# Patient Record
Sex: FEMALE | Race: WHITE | Employment: OTHER | URBAN - METROPOLITAN AREA
[De-identification: names, ages, dates, MRNs, and addresses within clinical notes are randomized per-mention and may not be internally consistent; named-entity substitution may affect disease eponyms.]

---

## 2020-01-30 ENCOUNTER — HOSPITAL ENCOUNTER (OUTPATIENT)
Dept: PHYSICAL THERAPY | Age: 70
Discharge: HOME | End: 2020-01-30
Payer: COMMERCIAL

## 2020-01-30 PROCEDURE — 97162 PT EVAL MOD COMPLEX 30 MIN: CPT

## 2020-01-30 PROCEDURE — 97530 THERAPEUTIC ACTIVITIES: CPT

## 2020-01-30 PROCEDURE — 97110 THERAPEUTIC EXERCISES: CPT
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
## 2020-02-03 ENCOUNTER — HOSPITAL ENCOUNTER (OUTPATIENT)
Dept: PHYSICAL THERAPY | Age: 70
Discharge: HOME | End: 2020-02-03
Payer: COMMERCIAL

## 2020-02-03 PROCEDURE — 97140 MANUAL THERAPY 1/> REGIONS: CPT

## 2020-02-03 PROCEDURE — 97110 THERAPEUTIC EXERCISES: CPT

## 2020-02-03 NOTE — PROGRESS NOTES
[facility] PT DAILY TREATMENT NOTE    Patient Name: Ashley Mcneal  Date:2/3/2020  : 1950  [x]  Patient  Verified  Payor: VA MEDICARE / Plan: VA MEDICARE PART A & B / Product Type: Medicare /    In time:1030  Out time:1115  Total Treatment Time (min): 55  Total Timed Codes (min): 40  1:1 Treatment Time ([facility] only): 40   Visit #: 2 of 16    Treatment Area: Left hip pain [M25.552]  Low back pain [M54.5]    SUBJECTIVE  Pain Level (0-10 scale): 10/10  Any medication changes, allergies to medications, adverse drug reactions, diagnosis change, or new procedure performed?: [x] No    [] Yes (see summary sheet for update)  Subjective functional status/changes:   [x] No changes reported    OBJECTIVE    Modalities Rationale:     decrease pain and increase tissue extensibility to improve patient's ability to perform ADLs without pain   min [] Estim, type/location:                                      []  att     []  unatt     []  w/US     []  w/ice    []  w/heat    min []  Mechanical Traction: type/lbs                   []  pro   []  sup   []  int   []  cont    []  before manual    []  after manual    min []  Ultrasound, settings/location:      min []  Iontophoresis w/ dexamethasone, location:                                               []  take home patch       []  in clinic   10 min []  Ice     [x]  Heat    location/position: L hip, R sidelying    min []  Vasopneumatic Device, press/temp:     min []  Other:    [x] Skin assessment post-treatment (if applicable):    [x]  intact    []  redness- no adverse reaction     []redness - adverse reaction:        23 min Therapeutic Exercise:  [x] See flow sheet :   Rationale: increase ROM and improve coordination to improve the patients ability to perform ADLs without pain. 12 min Manual Therapy:  STM L psoas, L piriformis   Rationale: increase ROM and increase tissue extensibility to improve the patients ability to  perform ADLs without pain.           With   [] TE   [x] TA   [] neuro   [] other: Patient Education: [x] Review HEP    [] Progressed/Changed HEP based on:   [] positioning   [] body mechanics   [] transfers   [] heat/ice application    [] other:        Pain Level (0-10 scale) post treatment: 3/10    ASSESSMENT/Changes in Function: Throughout stretching and therex, Pt gaining more ROM without symptoms. Pt given HEP for reduction of symptoms and increasing ROM through spine and pelvis. Patient will continue to benefit from skilled PT services to modify and progress therapeutic interventions, address ROM deficits, address strength deficits, analyze and address soft tissue restrictions and analyze and cue movement patterns to attain remaining goals. [x]  See Plan of Care  []  See progress note/recertification  []  See Discharge Summary         Progress towards goals / Updated goals:  Short Term Goals: To be accomplished in 4 weeks:  1. Patient will be compliant with HEP to progress toward goals and restore functional mobility. Eval Status:initiaited one exercise   2. Patient will improve FOTO score by 10% to improve functional tolerance for ADLs. Eval Status: 63  3. Patient will improve pain in L hip to 6/10  to improve ADL tolerance and restore prior level of function. Eval Status:10/10  Long Term Goals: To be accomplished in 8 weeks:  1. Patient will be independent with HEP to progress toward goals of performing ADLs independently and efficienty. Eval Status: initiaited  2. Kavya Dorsey will have improve spinal ROM in all directions without pain to improve mechanics for ambulation/ADLs. Eval Status: 25% with pain  3. Patient will have 4+/5 BLE strength to return to goals of riding bike. Eval Status: 4/5 or less, hip flexion NT due to pain  4. Patient will improve pain in L hip to 0-1/10 to improve ADL tolerance and restore prior level of function.   Eval Status: 10/10    PLAN  [x]  Upgrade activities as tolerated     []  Continue plan of care  []  Update interventions per flow sheet       []  Discharge due to:_  []  Other:_      Joyce Toro 2/3/2020  10:25 AM    Future Appointments   Date Time Provider Evelyn Crocker   2/3/2020 10:30 AM [facility]   2/7/2020  9:30 AM [facility]   2/11/2020  9:00 AM [facility]   2/13/2020 12:45 PM [facility]   2/18/2020  9:45 AM [facility]   2/20/2020 11:15 AM Ana [facility]   2/25/2020  9:45 AM [facility]   2/27/2020 10:30 AM [facility]   3/3/2020  9:45 AM [facility]   3/5/2020  9:45 AM Ana [facility]   3/10/2020  9:45 AM Ana [facility]   3/12/2020 10:30 AM [facility]   3/17/2020  9:45 AM [facility]   3/19/2020 10:30 AM [facility]   3/24/2020  9:45 AM [facility]   3/26/2020 10:30 AM [facility]   3/31/2020  9:45 AM [facility]   4/2/2020 10:30 AM Ana [facility]

## 2020-02-07 ENCOUNTER — HOSPITAL ENCOUNTER (OUTPATIENT)
Dept: PHYSICAL THERAPY | Age: 70
Discharge: HOME | End: 2020-02-07
Payer: COMMERCIAL

## 2020-02-07 PROCEDURE — 97140 MANUAL THERAPY 1/> REGIONS: CPT

## 2020-02-07 PROCEDURE — 97110 THERAPEUTIC EXERCISES: CPT

## 2020-02-07 NOTE — PROGRESS NOTES
PT DAILY TREATMENT NOTE    Patient Name: Nikole Gonzalez  Date:2020  : 1950  [x]  Patient  Verified  Payor: Derek Many / Plan: VA MEDICARE PART A & B / Product Type: Medicare /   In time:930  Out time:102  Total Treatment Time (min): 40  Total Timed Codes (min): 40  1:1 Treatment Time ( [address] only): 40   Visit #: 3 of 16    Treatment Area: Left hip pain [M25.552]  Low back pain [M54.5]    SUBJECTIVE  Pain Level (0-10 scale): 10/10  Any medication changes, allergies to medications, adverse drug reactions, diagnosis change, or new procedure performed?: [x] No    [] Yes (see summary sheet for update)  Subjective functional status/changes:   [] No changes reported  Pt reports soreness in hip for about 3 days. OBJECTIVE    Modalities Rationale:     decrease pain to improve patient's ability to perfrom ADLs without pain. min [] Estim, type/location:                                      []  att     []  unatt     []  w/US     []  w/ice    []  w/heat    min []  Mechanical Traction: type/lbs                   []  pro   []  sup   []  int   []  cont    []  before manual    []  after manual    min []  Ultrasound, settings/location:      min []  Iontophoresis w/ dexamethasone, location:                                               []  take home patch       []  in clinic   10 min []  Ice     [x]  Heat    location/position: L hip    min []  Vasopneumatic Device, press/temp:     min []  Other:    [x] Skin assessment post-treatment (if applicable):    [x]  intact    []  redness- no adverse reaction     []redness - adverse reaction:        25 min Therapeutic Exercise:  [x] See flow sheet :   Rationale: increase ROM and improve coordination to improve the patients ability to perform ADLs without pain. 15 min Manual Therapy:  L piriformis, glute, paraspinals   Rationale: decrease pain and increase ROM to improve the patients ability to perform ADLs without pain.            With   [x] TE   [] TA   [] neuro   [] other: Patient Education: [x] Review HEP    [] Progressed/Changed HEP based on:   [] positioning   [] body mechanics   [] transfers   [] heat/ice application    [] other:         Pain Level (0-10 scale) post treatment: 5/10    ASSESSMENT/Changes in Function: Focus on treatment included symptom reduction and stretching, given as HEP. Pt somewhat proficient with prior therex, requiring min cueing. Patient will continue to benefit from skilled PT services to modify and progress therapeutic interventions, address ROM deficits, address strength deficits and analyze and address soft tissue restrictions to attain remaining goals. [x]  See Plan of Care  []  See progress note/recertification  []  See Discharge Summary         Progress towards goals / Updated goals:  Short Term Goals: To be accomplished in 4 weeks:  1. Patient will be compliant with HEP to progress toward goals and restore functional mobility. Eval Status:initiaited one exercise   2. Patient will improve FOTO score by 10% to improve functional tolerance for ADLs. Eval Status: 63  3. Patient will improve pain in L hip to 6/10  to improve ADL tolerance and restore prior level of function. Eval Status:10/10  Long Term Goals: To be accomplished in 8 weeks:  1. Patient will be independent with HEP to progress toward goals of performing ADLs independently and efficienty. Eval Status: initiaited  2. Pam Hastings will have improve spinal ROM in all directions without pain to improve mechanics for ambulation/ADLs. Eval Status: 25% with pain  3. Patient will have 4+/5 BLE strength to return to goals of riding bike. Eval Status: 4/5 or less, hip flexion NT due to pain  4. Patient will improve pain in L hip to 0-1/10 to improve ADL tolerance and restore prior level of function.   Eval Status: 10/10    PLAN  [x]  Upgrade activities as tolerated     []  Continue plan of care  []  Update interventions per flow sheet       []  Discharge due to:_  []  Other:_ Mihai Fuchs 2/7/2020  9:35 AM    Future Appointments   Date Time Provider Evelyn Lizzette   2/11/2020  9:00 AM [facility]   2/13/2020 12:45 PM Onnie Messing [facility]   2/18/2020  9:45 AM Onnie Messing [facility]   2/20/2020 11:15 AM Onnie Messing [facility]   2/25/2020  9:45 AM Onnie Messing [facility]   2/27/2020 10:30 AM Onnie Messing [facility]   3/3/2020  9:45 AM Onnie Messing [facility]   3/5/2020  9:45 AM Onnie Messing [facility]   3/10/2020  9:45 AM Onnie Messing [facility]   3/12/2020 10:30 AM Onnie Messing [facility]   3/17/2020  9:45 AM Onnie Messing [facility]   3/19/2020 10:30 AM Onnie Messing [facility]   3/24/2020  9:45 AM Onnie Messing [facility]   3/26/2020 10:30 AM Onnie Messing [facility]   3/31/2020  9:45 AM Onnie Messing [facility]   4/2/2020 10:30 AM Onnie Messing [facility]

## 2020-02-11 ENCOUNTER — HOSPITAL ENCOUNTER (OUTPATIENT)
Dept: PHYSICAL THERAPY | Age: 70
Discharge: HOME | End: 2020-02-11
Payer: COMMERCIAL

## 2020-02-11 PROCEDURE — 97140 MANUAL THERAPY 1/> REGIONS: CPT

## 2020-02-11 PROCEDURE — 97110 THERAPEUTIC EXERCISES: CPT

## 2020-02-11 NOTE — PROGRESS NOTES
PT DAILY TREATMENT NOTE    Patient Name: Lelia Guevara  Date:2020  : 1950  [x]  Patient  Verified  Payor: Inessa Guzman / Plan: [place] [employer] / Product Type: HMO /    In time:0900  Out GLNX:[id number]  Total Treatment Time (min): 55  Total Timed Codes (min): 40  1:1 Treatment Time ( only): 40   Visit #: 4 of 16    Treatment Area: Left hip pain [M25.552]  Low back pain [M54.5]    SUBJECTIVE  Pain Level (0-10 scale): 10/10  Any medication changes, allergies to medications, adverse drug reactions, diagnosis change, or new procedure performed?: [x] No    [] Yes (see summary sheet for update)  Subjective functional status/changes:   [] No changes reported  Pt reports being stiff and having pain for about two days since last visit. OBJECTIVE    Modalities Rationale:     decrease pain and increase tissue extensibility to improve patient's ability to ambulate without pain. min [] Estim, type/location:                                      []  att     []  unatt     []  w/US     []  w/ice    []  w/heat    min []  Mechanical Traction: type/lbs                   []  pro   []  sup   []  int   []  cont    []  before manual    []  after manual    min []  Ultrasound, settings/location:      min []  Iontophoresis w/ dexamethasone, location:                                               []  take home patch       []  in clinic   10 min []  Ice     [x]  Heat    location/position: L hip    min []  Vasopneumatic Device, press/temp:     min []  Other:    [x] Skin assessment post-treatment (if applicable):    [x]  intact    []  redness- no adverse reaction     []redness - adverse reaction:        25 min Therapeutic Exercise:  [x] See flow sheet :   Rationale: increase ROM, increase strength and improve coordination to improve the patients ability to perform ADLs without pain.     15 min Manual Therapy:  STM L piriformis, QL, paraspinals    Rationale: decrease pain and increase ROM to improve the patients ability to ambulate without pain. With   [x] TE   [] TA   [] neuro   [] other: Patient Education: [x] Review HEP    [] Progressed/Changed HEP based on:   [] positioning   [] body mechanics   [] transfers   [] heat/ice application    [] other:         Pain Level (0-10 scale) post treatment: 5/10    ASSESSMENT/Changes in Function: Pts mobility is improving, incorporating more strengthening exercises with reduction in symptoms during treatment. Pt encouraged to walk as HEP to increase functioning. Patient will continue to benefit from skilled PT services to modify and progress therapeutic interventions, address functional mobility deficits, address ROM deficits and address strength deficits to attain remaining goals. [x]  See Plan of Care  []  See progress note/recertification  []  See Discharge Summary         Progress towards goals / Updated goals:  Short Term Goals: To be accomplished in 4 weeks:  1. Patient will be compliant with HEP to progress toward goals and restore functional mobility. Eval Status:initiaited one exercise   2. Patient will improve FOTO score by 10% to improve functional tolerance for ADLs. Eval Status: 63  3. Patient will improve pain in L hip to 6/10  to improve ADL tolerance and restore prior level of function. Eval Status:10/10  Long Term Goals: To be accomplished in 8 weeks:  1. Patient will be independent with HEP to progress toward goals of performing ADLs independently and efficienty. Eval Status: initiaited  2. Andre Aponte will have improve spinal ROM in all directions without pain to improve mechanics for ambulation/ADLs. Eval Status: 25% with pain  3. Patient will have 4+/5 BLE strength to return to goals of riding bike. Eval Status: 4/5 or less, hip flexion NT due to pain  4. Patient will improve pain in L hip to 0-1/10 to improve ADL tolerance and restore prior level of function.   Eval Status: 10/10    PLAN  [x]  Upgrade activities as tolerated     []  Continue plan of care  [] Update interventions per flow sheet       []  Discharge due to:_  []  Other:_      Derik Aparicio 2/11/2020  8:12 AM    Future Appointments   Date Time Provider Evelyn Crocker   2/11/2020  9:00 AM [facility]   2/13/2020 12:45 PM [facility]   2/18/2020  9:45 AM [facility]   2/20/2020 11:15 AM [facility]   2/25/2020  9:45 AM [facility]   2/27/2020 10:30 AM [facility]   3/3/2020  9:45 AM [facility]   3/5/2020  9:45 AM [facility]   3/10/2020  9:45 AM [facility]   3/12/2020 10:30 AM [facility]   3/17/2020  9:45 AM [facility]   3/19/2020 10:30 AM [facility] [place] [facility]   3/24/2020  9:45 AM [facility]   3/26/2020 10:30 AM [facility]   3/31/2020  9:45 AM [facility] [place] [facility]   4/2/2020 10:30 AM [facility]

## 2020-02-13 ENCOUNTER — HOSPITAL ENCOUNTER (OUTPATIENT)
Dept: PHYSICAL THERAPY | Age: 70
Discharge: HOME | End: 2020-02-13
Payer: COMMERCIAL

## 2020-02-13 PROCEDURE — 97530 THERAPEUTIC ACTIVITIES: CPT

## 2020-02-13 PROCEDURE — 97110 THERAPEUTIC EXERCISES: CPT

## 2020-02-13 NOTE — PROGRESS NOTES
PT DAILY TREATMENT NOTE    Patient Name: Swati Resendiz  Date:2020  : 1950  [x]  Patient  Verified  Payor: Yulia Lara / Plan: [employer] / Product Type: HMO /    In time:1245  Out time:1255  Total Treatment Time (min): 55  Total Timed Codes (min): 40  1:1 Treatment Time ( only): 40   Visit #: 5 of 16    Treatment Area: Left hip pain [M25.552]  Low back pain [M54.5]    SUBJECTIVE  Pain Level (0-10 scale): 7/10  Any medication changes, allergies to medications, adverse drug reactions, diagnosis change, or new procedure performed?: [x] No    [] Yes (see summary sheet for update)  Subjective functional status/changes:   [] No changes reported  Pt reports soreness and symptoms lasting one day after tx, but doing much better now. OBJECTIVE    Modalities Rationale:     decrease pain and increase tissue extensibility to improve patient's ability to perform ADLs without pain. min [] Estim, type/location:                                      []  att     []  unatt     []  w/US     []  w/ice    []  w/heat    min []  Mechanical Traction: type/lbs                   []  pro   []  sup   []  int   []  cont    []  before manual    []  after manual    min []  Ultrasound, settings/location:      min []  Iontophoresis w/ dexamethasone, location:                                               []  take home patch       []  in clinic   10 min []  Ice     [x]  Heat    location/position: L hip    min []  Vasopneumatic Device, press/temp:     min []  Other:    [x] Skin assessment post-treatment (if applicable):    [x]  intact    []  redness- no adverse reaction     []redness - adverse reaction:        30 min Therapeutic Exercise:  [x] See flow sheet :   Rationale: increase ROM, improve coordination and increase proprioception to improve the patients ability to perform ADLs without pain.      10 min Therapeutic Activity:  [x]  See flow sheet :   Rationale: increase ROM  to improve the patients ability to perform ADLs without pain. With   [] TE   [] TA   [] neuro   [] other: Patient Education: [x] Review HEP    [] Progressed/Changed HEP based on:   [] positioning   [] body mechanics   [] transfers   [] heat/ice application    [] other:        Pain Level (0-10 scale) post treatment: 2/10    ASSESSMENT/Changes in Function: Pts movement quality is improving with reduction in symptoms overall. Patient will continue to benefit from skilled PT services to modify and progress therapeutic interventions, address functional mobility deficits, address ROM deficits, address strength deficits and analyze and address soft tissue restrictions to attain remaining goals. [x]  See Plan of Care  []  See progress note/recertification  []  See Discharge Summary         Progress towards goals / Updated goals:  Short Term Goals: To be accomplished in 4 weeks:  1. Patient will be compliant with HEP to progress toward goals and restore functional mobility. Eval Status:initiaited one exercise   2. Patient will improve FOTO score by 10% to improve functional tolerance for ADLs. Eval Status: 63  3. Patient will improve pain in L hip to 6/10  to improve ADL tolerance and restore prior level of function. Eval Status:10/10  Long Term Goals: To be accomplished in 8 weeks:  1. Patient will be independent with HEP to progress toward goals of performing ADLs independently and efficienty. Eval Status: initiaited  2. Ever Kerr will have improve spinal ROM in all directions without pain to improve mechanics for ambulation/ADLs. Eval Status: 25% with pain  3. Patient will have 4+/5 BLE strength to return to goals of riding bike. Eval Status: 4/5 or less, hip flexion NT due to pain  4. Patient will improve pain in L hip to 0-1/10 to improve ADL tolerance and restore prior level of function.   Eval Status: 10/10    PLAN  [x]  Upgrade activities as tolerated     []  Continue plan of care  []  Update interventions per flow sheet       []  Discharge due to:_  []  Other:_      Raquel Bradley 2/13/2020  11:05 AM    Future Appointments   Date Time Provider Evelyn Lizzette   [facility]2020 12[facility]   2/18/2020  9:[facility]   2/20/2020 11:[facility]   2/25/2020  9:45 [facility]   2/27/2020 10:30 [facility]   3/3/2020  9:45 [facility] [place] [facility]   3/5/2020  9[facility]45 [facility] [place] [facility]   3/10/2020  9:45 AM [facility] [place] [facility]   3/12/2020 10:30 [facility]   3/17/2020  9:45 AM [facility]   3/19/2020 10:30 [facility]   3/24/2020  9:45 [facility] [place] [facility]   3/26/2020 10:30 [facility] [place] [facility]   3/31/2020  9:45 [facility] [place] [facility]   [facility]/2/2020 [facility]

## 2020-02-18 ENCOUNTER — HOSPITAL ENCOUNTER (OUTPATIENT)
Dept: PHYSICAL THERAPY | Age: 70
Discharge: HOME | End: 2020-02-18
Payer: COMMERCIAL

## 2020-02-18 PROCEDURE — 97110 THERAPEUTIC EXERCISES: CPT

## 2020-02-18 NOTE — PROGRESS NOTES
PT DAILY TREATMENT NOTE    Patient Name: Mitch Zavala  Date:2020  : 1950  [x]  Patient  Verified  Payor: Ami Donis / Plan: [employer] / Product Type: HMO /    In JSSO:[id number]  Out time:1030  Total Treatment Time (min): 55  Total Timed Codes (min): 40  1:1 Treatment Time ( [address] only): 40   Visit #: 6 of 16    Treatment Area: Left hip pain [M25.552]  Low back pain [M54.5]    SUBJECTIVE  Pain Level (0-10 scale): 10/10  Any medication changes, allergies to medications, adverse drug reactions, diagnosis change, or new procedure performed?: [x] No    [] Yes (see summary sheet for update)  Subjective functional status/changes:   [] No changes reported  Pt confirmed from OBGYN: posterior uterine body mass 0.6x 0.6x 0.3 cm, uterine leiomyoma, benign. OBJECTIVE    Modalities Rationale:     decrease pain and increase tissue extensibility to improve patient's ability to perform ADLs without pain. min [] Estim, type/location:                                      []  att     []  unatt     []  w/US     []  w/ice    []  w/heat    min []  Mechanical Traction: type/lbs                   []  pro   []  sup   []  int   []  cont    []  before manual    []  after manual    min []  Ultrasound, settings/location:      min []  Iontophoresis w/ dexamethasone, location:                                               []  take home patch       []  in clinic   10 min []  Ice     [x]  Heat    location/position: L hip    min []  Vasopneumatic Device, press/temp:     min []  Other:    [x] Skin assessment post-treatment (if applicable):    [x]  intact    []  redness- no adverse reaction     []redness - adverse reaction:        40 min Therapeutic Exercise:  [x] See flow sheet :   Rationale: increase ROM, increase strength and improve coordination to improve the patients ability to perform ADLs without pain.           With   [x] TE   [] TA   [] neuro   [] other: Patient Education: [x] Review HEP    [] Progressed/Changed HEP based on: [] positioning   [] body mechanics   [] transfers   [] heat/ice application    [] other:        Pain Level (0-10 scale) post treatment: 3/10    ASSESSMENT/Changes in Function: Pt continues to improve with less cueing during therex indicating adherence to HEP. Pt reports no increase in symptoms during therex and will benefit from education on neuro-pain science as Pt tends to catastrophize. Patient will continue to benefit from skilled PT services to modify and progress therapeutic interventions, address functional mobility deficits, address ROM deficits and address strength deficits to attain remaining goals. [x]  See Plan of Care  []  See progress note/recertification  []  See Discharge Summary         Progress towards goals / Updated goals:  Short Term Goals: To be accomplished in 4 weeks:  1. Patient will be compliant with HEP to progress toward goals and restore functional mobility. Eval Status:initiaited one exercise   2/18/20: adherent to HEP daily   2. Patient will improve FOTO score by 10% to improve functional tolerance for ADLs. Eval Status: 63  3. Patient will improve pain in L hip to 6/10  to improve ADL tolerance and restore prior level of function. Eval Status:10/10  Long Term Goals: To be accomplished in 8 weeks:  1. Patient will be independent with HEP to progress toward goals of performing ADLs independently and efficienty. Eval Status: initiaited  2. Logan Donovan will have improve spinal ROM in all directions without pain to improve mechanics for ambulation/ADLs. Eval Status: 25% with pain  2/18/20: 50% limited pain   3. Patient will have 4+/5 BLE strength to return to goals of riding bike. Eval Status: 4/5 or less, hip flexion NT due to pain  4. Patient will improve pain in L hip to 0-1/10 to improve ADL tolerance and restore prior level of function.   Eval Status: 10/10    PLAN  [x]  Upgrade activities as tolerated     []  Continue plan of care  []  Update interventions per flow sheet       []  Discharge due to:_  []  Other:_      Derik Markus 2/18/2020  9:57 AM    Future Appointments   Date Time Provider Evelyn Lizzette   2/20/2020 11:15 AM [facility]   2/25/2020  9:45 AM [facility]   2/27/2020 10:30 AM [facility]   3/3/2020  9:45 AM [facility]   3/5/2020  9:45 AM [facility]   3/10/2020  9:45 AM [facility]   3/12/2020 10:30 AM [facility]   3/17/2020  9:45 AM [facility]   3/19/2020 10:30 AM [facility]   3/24/2020  9:45 AM [facility]   3/26/2020 10:30 AM [facility]   3/31/2020  9:45 AM [facility] [place] [facility]   4/2/2020 10:30 AM [facility]

## 2020-02-20 ENCOUNTER — HOSPITAL ENCOUNTER (OUTPATIENT)
Dept: PHYSICAL THERAPY | Age: 70
Discharge: HOME | End: 2020-02-20
Payer: COMMERCIAL

## 2020-02-20 PROCEDURE — 97110 THERAPEUTIC EXERCISES: CPT

## 2020-02-20 NOTE — PROGRESS NOTES
PT DAILY TREATMENT NOTE    Patient Name: Kalyn Herrera  Date:2020  : 1950  [x]  Patient  Verified  Payor: Ellen Plummer / Plan: [employer] / Product Type: HMO /    In time:1115  Out time:1210  Total Treatment Time (min): 40  Total Timed Codes (min): 40  1:1 Treatment Time ( only): 40   Visit #: 7 of 16    Treatment Area: Left hip pain [M25.552]  Low back pain [M54.5]    SUBJECTIVE  Pain Level (0-10 scale): 8/10  Any medication changes, allergies to medications, adverse drug reactions, diagnosis change, or new procedure performed?: [x] No    [] Yes (see summary sheet for update)  Subjective functional status/changes:   [x] No changes reported      OBJECTIVE    Modalities Rationale:     decrease pain and increase tissue extensibility to improve patient's ability to perform ADLs without pain. min [] Estim, type/location:                                      []  att     []  unatt     []  w/US     []  w/ice    []  w/heat    min []  Mechanical Traction: type/lbs                   []  pro   []  sup   []  int   []  cont    []  before manual    []  after manual    min []  Ultrasound, settings/location:      min []  Iontophoresis w/ dexamethasone, location:                                               []  take home patch       []  in clinic   10 min []  Ice     [x]  Heat    location/position: L hip    min []  Vasopneumatic Device, press/temp:     min []  Other:    [x] Skin assessment post-treatment (if applicable):    [x]  intact    []  redness- no adverse reaction     []redness - adverse reaction:        40 min Therapeutic Exercise:  [x] See flow sheet :   Rationale: increase ROM, increase strength and improve coordination to improve the patients ability to perform ADLs without pain.     With   [] TE   [] TA   [] neuro   [] other: Patient Education: [x] Review HEP    [] Progressed/Changed HEP based on:   [] positioning   [] body mechanics   [] transfers   [] heat/ice application    [] other:        Pain Level (0-10 scale) post treatment: 8/10    ASSESSMENT/Changes in Function: Pt progressing well with therex and mobility exercises. Will progress to seated and standing activities moving forward. Patient will continue to benefit from skilled PT services to modify and progress therapeutic interventions, address ROM deficits, address strength deficits and analyze and address soft tissue restrictions to attain remaining goals. [x]  See Plan of Care  []  See progress note/recertification  []  See Discharge Summary         Progress towards goals / Updated goals:  Short Term Goals: To be accomplished in 4 weeks:  1. Patient will be compliant with HEP to progress toward goals and restore functional mobility. Eval Status:initiaited one exercise   Current: 2/18/20: adherent to HEP daily   2. Patient will improve FOTO score by 10% to improve functional tolerance for ADLs. Eval Status: 63  Current: 2/18  3. Patient will improve pain in L hip to 6/10  to improve ADL tolerance and restore prior level of function. Eval Status:10/10  Long Term Goals: To be accomplished in 8 weeks:  1. Patient will be independent with HEP to progress toward goals of performing ADLs independently and efficienty. Eval Status: initiaited  2. Maikel James will have improve spinal ROM in all directions without pain to improve mechanics for ambulation/ADLs. Eval Status: 25% with pain  2/18/20: 50% limited pain   3. Patient will have 4+/5 BLE strength to return to goals of riding bike. Eval Status: 4/5 or less, hip flexion NT due to pain  4. Patient will improve pain in L hip to 0-1/10 to improve ADL tolerance and restore prior level of function.   Eval Status: 10/10  Current: 2/20/20: 8/10      PLAN  [x]  Upgrade activities as tolerated     []  Continue plan of care  []  Update interventions per flow sheet       []  Discharge due to:_  []  Other:_      Hulen Lefort 2/20/2020  10:34 AM    Future Appointments   Date Time Provider Department [place]   2/20/2020 11:15 [facility]   2/25/2020  9:45 AM [facility]   2/27/2020 10:30 [facility]   [facility]3/2020  [facility]   3/5/2020  9:[facility]   3/10/2020  9:[facility]   3/12/2020 10:30 [facility]   3/17/2020  9:45 [facility]   3/19/2020 10:30 AM [facility]   3/24/2020  9:[facility]   3/26/2020 10:30 [facility]   3/31/2020  9:45 [facility]   4/2/2020 10:30 [facility]

## 2020-02-25 ENCOUNTER — HOSPITAL ENCOUNTER (OUTPATIENT)
Dept: PHYSICAL THERAPY | Age: 70
Discharge: HOME | End: 2020-02-25
Payer: COMMERCIAL

## 2020-02-25 PROCEDURE — 97110 THERAPEUTIC EXERCISES: CPT

## 2020-02-25 NOTE — PROGRESS NOTES
PT DAILY TREATMENT NOTE    Patient Name: Mitch Zavala  OPPK:  : 1950  [x]  Patient  Verified  Payor: Ami Donis / Plan: [place] [employer] / Product Type: HMO /    In time:950  Out time:1050  Total Treatment Time (min): 60  Total Timed Codes (min): 40  1:1 Treatment Time ( only): 40   Visit #: 8 of 16    Treatment Area: Left hip pain [M25.552]  Low back pain [M54.5]    SUBJECTIVE  Pain Level (0-10 scale): 9/10  Any medication changes, allergies to medications, adverse drug reactions, diagnosis change, or new procedure performed?: [x] No    [] Yes (see summary sheet for update)  Subjective functional status/changes:   [] No changes reported  Pt reports taking a warm shower due to soreness in glutes. OBJECTIVE    Modalities Rationale:     decrease pain and increase tissue extensibility to improve patient's ability to perform ADLs without pain. min [] Estim, type/location:                                      []  att     []  unatt     []  w/US     []  w/ice    []  w/heat    min []  Mechanical Traction: type/lbs                   []  pro   []  sup   []  int   []  cont    []  before manual    []  after manual    min []  Ultrasound, settings/location:      min []  Iontophoresis w/ dexamethasone, location:                                               []  take home patch       []  in clinic   10 min []  Ice     [x]  Heat    location/position: L hip    min []  Vasopneumatic Device, press/temp:     min []  Other:    [x] Skin assessment post-treatment (if applicable):    [x]  intact    []  redness- no adverse reaction     []redness - adverse reaction:        40 min Therapeutic Exercise:  [x] See flow sheet :   Rationale: increase ROM, increase strength and improve coordination to improve the patients ability to perform ADLs without pain.     With   [x] TE   [] TA   [] neuro   [] other: Patient Education: [x] Review HEP    [] Progressed/Changed HEP based on:   [] positioning   [] body mechanics   [] transfers   [] heat/ice application    [] other:        Pain Level (0-10 scale) post treatment: 2/10    ASSESSMENT/Changes in Function: Pt continues to progress with strengthening and endurance therex for glutes and hips. Has progressed to standing activities and given HEP. Patient will continue to benefit from skilled PT services to address functional mobility deficits, address ROM deficits, address strength deficits and analyze and address soft tissue restrictions to attain remaining goals. [x]  See Plan of Care  []  See progress note/recertification  []  See Discharge Summary         Progress towards goals / Updated goals:  Short Term Goals: To be accomplished in 4 weeks:  1. Patient will be compliant with HEP to progress toward goals and restore functional mobility. Eval Status:initiaited one exercise   Current: 2/18/20: adherent to HEP daily   2. Patient will improve FOTO score by 10% to improve functional tolerance for ADLs. Eval Status: 63  Current: 2/18  3. Patient will improve pain in L hip to 6/10  to improve ADL tolerance and restore prior level of function. Eval Status:10/10  Long Term Goals: To be accomplished in 8 weeks:  1. Patient will be independent with HEP to progress toward goals of performing ADLs independently and efficienty. Eval Status: initiaited  2. Leann Mena will have improve spinal ROM in all directions without pain to improve mechanics for ambulation/ADLs. Eval Status: 25% with pain  2/18/20: 50% limited pain   3. Patient will have 4+/5 BLE strength to return to goals of riding bike. Eval Status: 4/5 or less, hip flexion NT due to pain  4. Patient will improve pain in L hip to 0-1/10 to improve ADL tolerance and restore prior level of function.   Eval Status: 10/10  Current: 2/20/20: 8/10      PLAN  [x]  Upgrade activities as tolerated     []  Continue plan of care  []  Update interventions per flow sheet       []  Discharge due to:_  []  Other:_      CEAMP Ivette Brice 2/25/2020  8:44 AM    Future Appointments   Date Time Provider Evelyn Crocker   2/25/2020  9:45 AM Remi Raw [facility]   2/27/2020 10:30 AM Remi Raw [facility]   3/3/2020  9:45 AM Remi Raw [facility]   3/5/2020  9:45 AM Remi Raw [facility]   3/10/2020  9:45 AM Remi Raw [facility]   3/12/2020 10:30 AM Remi Raw [facility]   3/17/2020  9:45 AM Remi Raw [facility]   3/19/2020 10:30 AM Remi Raw [facility]   3/24/2020  9:45 AM [facility]   3/26/2020 10:30 AM Remi Raw [facility]   3/31/2020  9:45 AM [facility]   4/2/2020 10:30 AM Remi Raw [facility]

## 2020-02-27 ENCOUNTER — APPOINTMENT (OUTPATIENT)
Dept: PHYSICAL THERAPY | Age: 70
End: 2020-02-27
Payer: COMMERCIAL

## 2020-03-03 ENCOUNTER — HOSPITAL ENCOUNTER (OUTPATIENT)
Dept: PHYSICAL THERAPY | Age: 70
Discharge: HOME | End: 2020-03-03
Payer: COMMERCIAL

## 2020-03-03 PROCEDURE — 97110 THERAPEUTIC EXERCISES: CPT

## 2020-03-03 NOTE — PROGRESS NOTES
PT DAILY TREATMENT NOTE    Patient Name: Steve Ruiz  Date:3/3/2020  : 1950  [x]  Patient  Verified  Payor: Micah Chung / Plan: [place] [employer] / Product Type: HMO /    In JSHP:8884  Out time:1040  Total Treatment Time (min): 55  Total Timed Codes (min): 40  1:1 Treatment Time ( [address] only): 40   Visit #: 9 of 16    Treatment Area: Left hip pain [M25.552]  Low back pain [M54.5]    SUBJECTIVE  Pain Level (0-10 scale): 7/10  Any medication changes, allergies to medications, adverse drug reactions, diagnosis change, or new procedure performed?: [x] No    [] Yes (see summary sheet for update)  Subjective functional status/changes:   [] No changes reported  Pt reports hip pain has almost gone away but low back is still hurting with functional activities (cleaning litter box). OBJECTIVE    Modalities Rationale:     decrease pain to improve patient's ability to perform ADLs without pain. min [] Estim, type/location:                                      []  att     []  unatt     []  w/US     []  w/ice    []  w/heat    min []  Mechanical Traction: type/lbs                   []  pro   []  sup   []  int   []  cont    []  before manual    []  after manual    min []  Ultrasound, settings/location:      min []  Iontophoresis w/ dexamethasone, location:                                               []  take home patch       []  in clinic   10 min []  Ice     [x]  Heat    location/position: L hip    min []  Vasopneumatic Device, press/temp:     min []  Other:    [x] Skin assessment post-treatment (if applicable):    [x]  intact    []  redness- no adverse reaction     []redness - adverse reaction:        40 min Therapeutic Exercise:  [x] See flow sheet :   Rationale: increase ROM, increase strength and improve coordination to improve the patients ability to perform ADLs without pain.           With   [] TE   [] TA   [] neuro   [] other: Patient Education: [x] Review HEP    [] Progressed/Changed HEP based on:   [] positioning   [] body mechanics   [] transfers   [] heat/ice application    [] other:      Other Objective/Functional Measures: Pt improving with lumbar AROM, without pain     Pain Level (0-10 scale) post treatment: 7/10    ASSESSMENT/Changes in Function: Pt continues to focus on the pain and fear avoidance behaviors requiring constant encouragement and education on pain management, difference between real and perceived pain from a noxious stimulus. Pt progressing with therex but still requires min cueing for HEP exercises. Patient will continue to benefit from skilled PT services to address functional mobility deficits, address ROM deficits, address strength deficits and analyze and cue movement patterns to attain remaining goals. []  See Plan of Care  [x]  See progress note/recertification  []  See Discharge Summary         Progress towards goals / Updated goals:  Short Term Goals: To be accomplished in 4 weeks:  1. Patient will be compliant with HEP to progress toward goals and restore functional mobility. Eval Status: initiaited one exercise    Current: 3/3/20 adherent to HEP daily   2. Patient will improve FOTO score by 10% to improve functional tolerance for ADLs. Eval Status: 63   Current: 3/3/20 63, Not Met  3. Patient will improve pain in L hip to 6/10  to improve ADL tolerance and restore prior level of function. Eval Status:10/10   Current: 3/3/20 7/10 pain, Progressing   Long Term Goals: To be accomplished in 8 weeks:  1. Patient will be independent with HEP to progress toward goals of performing ADLs independently and efficienty. Eval Status: initiated   Current: 3/3/20 adherent to HEP daily   2. Alejandro Arenas will have improve spinal ROM in all directions without pain to improve mechanics for ambulation/ADLs. Eval Status: 25% with pain  2/18/20: 50% limited pain    Current: 3/3/20 50-75% no pain, Progressing  3. Patient will have 4+/5 BLE strength to return to goals of riding bike.   Eval Status: 4/5 or less, hip flexion NT due to pain   Current: 3/3/20 4/5 no pain, Progressing  4. Patient will improve pain in L hip to 0-1/10 to improve ADL tolerance and restore prior level of function.   Eval Status: 10/10  Current: 2/20/20: 8/10  Current: 3/3/20 7/10 pain, Progressing    PLAN  [x]  Upgrade activities as tolerated     []  Continue plan of care  []  Update interventions per flow sheet       []  Discharge due to:_  []  Other:_      Sydney Dayl 3/3/2020  9:43 AM    Future Appointments   Date Time Provider Evelyn Crocker   3/3/2020  9:45 AM [facility]   3/5/2020  9:45 AM [facility]   3/10/2020  9:45 AM [facility]   3/12/2020 10:30 AM [facility]   3/17/2020  9:45 AM [facility]   3/20/2020  9:30 AM [facility]   3/24/2020  9:45 AM [facility]   3/27/2020  9:30 AM [facility]   3/31/2020  9:45 AM [facility]   4/2/2020 10:30 AM [facility]

## 2020-03-03 NOTE — PROGRESS NOTES
[facility]  [address], [address] [place]  Ph [phone]  Fx [phone]    Physical Therapy Progress Note  Patient name: Linda Hester Start of Care: 2020   Referral source: Daralene Cowden, MD : 1950                Medical Diagnosis: Left hip pain [M25.552]  Low back pain [M54.5]    Onset Date:             2020   Treatment Diagnosis: Left hip pain [M25.552]  Low back pain [M54.5]                                               Prior Hospitalization: see medical history Provider#: [id number]   Medications: Verified on Patient summary List    Comorbidities: Jose in L tibia/fibula , cervical fusion , diagnosed with spondylolisthesis     Prior Level of Function: Independent with all ADLs, and community activities     Visits from Start of Care: 9    Missed Visits: 1    Key Functional Changes: Pt has shown improvements in BLE ROM and strength, with a reduction in pain. Pt will continue to benefit from skilled PT to improve functional movements and address biomechanical faults to prevent reoccurrence of low back and hip pain in the future. Updated Goals: To be achieved in 4 weeks:  Short Term Goals: To be accomplished in 4 weeks:  1. Patient will be compliant with HEP to progress toward goals and restore functional mobility. Eval Status: initiaited one exercise    Current: 20 adherent to HEP daily   2. Patient will improve FOTO score by 10% to improve functional tolerance for ADLs. Eval Status: 63   Current: 2063, Not Met  3. Patient will improve pain in L hip to /10  to improve ADL tolerance and restore prior level of function. Eval Status:10/10   Current: 2/25/207/10 pain, Progressing   Long Term Goals: To be accomplished in 8 weeks:  1. Patient will be independent with HEP to progress toward goals of performing ADLs independently and efficienty. Eval Status: initiated   Current: 20adherent to HEP daily   2.  Patietn will have improve spinal ROM in all directions without pain to improve mechanics for ambulation/ADLs. Eval Status: 25% with pain  2/18/20: 50% limited pain    Current: 2/25/2050-75% no pain, Progressing  3. Patient will have 4+/5 BLE strength to return to goals of riding bike. Eval Status: 4/5 or less, hip flexion NT due to pain   Current: 2/25/204/5 no pain, Progressing  4. Patient will improve pain in L hip to 0-1/10 to improve ADL tolerance and restore prior level of function.   Eval Status: 10/10  Current: 2/20/20: 8/10  Current: 2/25/207/10 pain, Progressing    ASSESSMENT/RECOMMENDATIONS:  [x]Continue therapy per initial plan/protocol at a frequency of  2 x per week for 4 weeks  []Continue therapy with the following recommended changes:_____________________ _____________________________ ________________________________________  []Discontinue therapy progressing towards or have reached established goals  []Discontinue therapy due to lack of appreciable progress towards goals  []Discontinue therapy due to lack of attendance or compliance  []Await Physician's recommendations/decisions regarding therapy  []Other:________________________________________________________________    Thank you for this referral.   Eda Castano 3/3/2020 12:26 PM

## 2020-03-05 ENCOUNTER — HOSPITAL ENCOUNTER (OUTPATIENT)
Dept: PHYSICAL THERAPY | Age: 70
Discharge: HOME | End: 2020-03-05
Payer: COMMERCIAL

## 2020-03-05 PROCEDURE — 97140 MANUAL THERAPY 1/> REGIONS: CPT

## 2020-03-05 PROCEDURE — 97110 THERAPEUTIC EXERCISES: CPT

## 2020-03-05 NOTE — PROGRESS NOTES
PT DAILY TREATMENT NOTE    Patient Name: Dulce Maria Gayle  Date:3/5/2020  : 1950  [x]  Patient  Verified  Payor: Trent Palomino / Plan: [place] [employer] PART A [employer] B / Product Type: Medicare /    In GFKQ:3191  Out time:1040  Total Treatment Time (min): 55  Total Timed Codes (min): 40  1:1 Treatment Time ([facility] only): 40   Visit #: 10 of 16    Treatment Area: Left hip pain [M25.552]  Low back pain [M54.5]    SUBJECTIVE  Pain Level (0-10 scale): 8/10  Any medication changes, allergies to medications, adverse drug reactions, diagnosis change, or new procedure performed?: [x] No    [] Yes (see summary sheet for update)  Subjective functional status/changes:   [] No changes reported  Pt reports soreness in hip from the \"popping\". OBJECTIVE    Modalities Rationale:     decrease pain and increase tissue extensibility to improve patient's ability to perform ADLs without pain. min [] Estim, type/location:                                      []  att     []  unatt     []  w/US     []  w/ice    []  w/heat    min []  Mechanical Traction: type/lbs                   []  pro   []  sup   []  int   []  cont    []  before manual    []  after manual    min []  Ultrasound, settings/location:      min []  Iontophoresis w/ dexamethasone, location:                                               []  take home patch       []  in clinic   10 min []  Ice     [x]  Heat    location/position: L hip    min []  Vasopneumatic Device, press/temp:     min []  Other:    [x] Skin assessment post-treatment (if applicable):    [x]  intact    []  redness- no adverse reaction     []redness - adverse reaction:        30 min Therapeutic Exercise:  [x] See flow sheet :   Rationale: increase strength and improve coordination to improve the patients ability to perform ADLs without pain. 10 min Manual Therapy:  L ant tib   Rationale: decrease pain and increase tissue extensibility to improve the patients ability to perform ADLs without pain. With   [x] TE   [] TA   [] neuro   [] other: Patient Education: [x] Review HEP    [] Progressed/Changed HEP based on:   [] positioning   [] body mechanics   [] transfers   [] heat/ice application    [] other:         Pain Level (0-10 scale) post treatment: 6/10    ASSESSMENT/Changes in Function: Pt continues to show improved coordination with verbal and tactile cueing needed during full body activities. Patient will continue to benefit from skilled PT services to modify and progress therapeutic interventions, address strength deficits, analyze and address soft tissue restrictions and analyze and cue movement patterns to attain remaining goals. [x]  See Plan of Care  []  See progress note/recertification  []  See Discharge Summary         Progress towards goals / Updated goals:  Short Term Goals: To be accomplished in 4 weeks:  1. Patient will be compliant with HEP to progress toward goals and restore functional mobility. Eval Status: initiaited one exercise               Current: 3/3/20 adherent to HEP daily   2. Patient will improve FOTO score by 10% to improve functional tolerance for ADLs. Eval Status: 63              Current: 3/3/20 63, Not Met  3. Patient will improve pain in L hip to 6/10  to improve ADL tolerance and restore prior level of function. Eval Status:10/10              Current: 3/3/20 7/10 pain, Progressing   Long Term Goals: To be accomplished in 8 weeks:  1. Patient will be independent with HEP to progress toward goals of performing ADLs independently and efficienty. Eval Status: initiated              Current: 3/3/20 adherent to HEP daily   2. Leann Mena will have improve spinal ROM in all directions without pain to improve mechanics for ambulation/ADLs. Eval Status: 25% with pain  2/18/20: 50% limited pain               Current: 3/3/20 50-75% no pain, Progressing  3. Patient will have 4+/5 BLE strength to return to goals of riding bike.   Eval Status: 4/5 or less, hip flexion NT due to pain              Current: 3/3/20 4/5 no pain, Progressing  4. Patient will improve pain in L hip to 0-1/10 to improve ADL tolerance and restore prior level of function.   Eval Status: 10/10  Current: 2/20/20: 8/10  Current: 3/3/20 7/10 pain, Progressing    PLAN  [x]  Upgrade activities as tolerated     []  Continue plan of care  []  Update interventions per flow sheet       []  Discharge due to:_  []  Other:_      Doroteo Rucker 3/5/2020  9:54 AM    Future Appointments   Date Time Provider Evelyn Crocker   3/10/2020  9:45 AM [facility]   3/12/2020 10:30 AM [facility]   3/17/2020  9:45 AM [facility]   3/20/2020  9:30 AM [facility]   3/24/2020  9:45 AM [facility]   3/27/2020  9:30 AM [facility]   3/31/2020  9:45 AM [facility]   4/2/2020 10:30 AM [facility]

## 2020-03-10 ENCOUNTER — HOSPITAL ENCOUNTER (OUTPATIENT)
Dept: PHYSICAL THERAPY | Age: 70
Discharge: HOME | End: 2020-03-10
Payer: COMMERCIAL

## 2020-03-10 PROCEDURE — 97110 THERAPEUTIC EXERCISES: CPT

## 2020-03-10 NOTE — PROGRESS NOTES
PT DAILY TREATMENT NOTE    Patient Name: El Carlos  Date:3/10/2020  : 1950  [x]  Patient  Verified  Payor: Tanvi Finch / Plan: [place] MEDICARE PART A & B / Product Type: Medicare /    In time:940  Out time:1040  Total Treatment Time (min): 60  Total Timed Codes (min): 45  1:1 Treatment Time ( [address] only): 39   Visit #: 11 of 16    Treatment Area: Left hip pain [M25.552]  Low back pain [M54.5]    SUBJECTIVE  Pain Level (0-10 scale): 7/10  Any medication changes, allergies to medications, adverse drug reactions, diagnosis change, or new procedure performed?: [x] No    [] Yes (see summary sheet for update)  Subjective functional status/changes:   [] No changes reported  Pt reports feeling more limber, some pain in spine and hip. OBJECTIVE    Modalities Rationale:     decrease pain to improve patient's ability to perform ADLs without pain. min [] Estim, type/location:                                      []  att     []  unatt     []  w/US     []  w/ice    []  w/heat    min []  Mechanical Traction: type/lbs                   []  pro   []  sup   []  int   []  cont    []  before manual    []  after manual    min []  Ultrasound, settings/location:      min []  Iontophoresis w/ dexamethasone, location:                                               []  take home patch       []  in clinic   10 min []  Ice     [x]  Heat    location/position: L hip    min []  Vasopneumatic Device, press/temp:     min []  Other:    [x] Skin assessment post-treatment (if applicable):    []  intact    []  redness- no adverse reaction     []redness - adverse reaction:        45 min Therapeutic Exercise:  [x] See flow sheet :   Rationale: increase ROM, increase strength and improve coordination to improve the patients ability to perform ADLs without pain.        With   [x] TE   [] TA   [] neuro   [] other: Patient Education: [x] Review HEP    [] Progressed/Changed HEP based on:   [] positioning   [] body mechanics   [] transfers   [] heat/ice application    [] other:         Pain Level (0-10 scale) post treatment: 5/10    ASSESSMENT/Changes in Function: Pt requires verbal and tactile cueing during full body movements indicating decreased coordination. Overall symptom reduction with increased ease of movement. Pt still fixated on \"pain\". Patient will continue to benefit from skilled PT services to modify and progress therapeutic interventions, address functional mobility deficits, address strength deficits and analyze and address soft tissue restrictions to attain remaining goals. [x]  See Plan of Care  []  See progress note/recertification  []  See Discharge Summary         Progress towards goals / Updated goals:  Short Term Goals: To be accomplished in 4 weeks:  1. Patient will be compliant with HEP to progress toward goals and restore functional mobility. Eval Status: initiaited one exercise               NFLTC: 3/3/20 adherent to HEP daily   2. Patient will improve FOTO score by 10% to improve functional tolerance for ADLs. Eval Status: 63              Current: 3/3/20 63, Not Met  3. Patient will improve pain in L hip to 6/10  to improve ADL tolerance and restore prior level of function. Eval Status:10/10              SBVOUYE: 3/3/20 7/10 pain, Progressing   Long Term Goals: To be accomplished in 8 weeks:  1. Patient will be independent with HEP to progress toward goals of performing ADLs independently and efficienty. Eval Status: initiated              XPZHNLS: 3/3/20 adherent to HEP daily   2. Pam Hastings will have improve spinal ROM in all directions without pain to improve mechanics for ambulation/ADLs. Eval Status: 25% with pain  2/18/20: 50% limited pain               Current: 3/3/20 50-75% no pain, Progressing  3. Patient will have 4+/5 BLE strength to return to goals of riding bike. Eval Status: 4/5 or less, hip flexion NT due to pain              Current: 3/3/20 4/5 no pain, Progressing  4.  Patient will improve pain in L hip to 0-1/10 to improve ADL tolerance and restore prior level of function.   Eval Status: 10/10  Current: 2/20/20: 8/10  Current: 3/3/20 7/10 pain, Progressing    PLAN  [x]  Upgrade activities as tolerated     []  Continue plan of care  []  Update interventions per flow sheet       []  Discharge due to:_  []  Other:_      Brady Duverney 3/10/2020  9:40 AM    Future Appointments   Date Time Provider Evelyn Crocker   3/10/2020  9:45 AM [facility]   3/12/2020 10:30 AM [facility]   3/17/2020  9:45 AM [facility]   3/20/2020  9:30 AM [facility]   3/24/2020  9:45 AM [facility]   3/27/2020  9:30 AM [facility]   3/31/2020  9:45 AM [facility]   4/2/2020 10:30 AM [facility]

## 2020-03-12 ENCOUNTER — HOSPITAL ENCOUNTER (OUTPATIENT)
Dept: PHYSICAL THERAPY | Age: 70
Discharge: HOME | End: 2020-03-12
Payer: COMMERCIAL

## 2020-03-12 PROCEDURE — 97110 THERAPEUTIC EXERCISES: CPT

## 2020-03-12 NOTE — PROGRESS NOTES
PT DAILY TREATMENT NOTE    Patient Name: Lelia Guevara  Date:3/12/2020  : 1950  [x]  Patient  Verified  Payor: [address] / Plan: [place] & [place] / Product Type: Medicare /    In time:1030  Out time:1125  Total Treatment Time (min): 55  Total Timed Codes (min): 40  1:1 Treatment Time ( [address] only): 40   Visit #: 12 of 16    Treatment Area: Left hip pain [M25.552]  Low back pain [M54.5]    SUBJECTIVE  Pain Level (0-10 scale): 6/10  Any medication changes, allergies to medications, adverse drug reactions, diagnosis change, or new procedure performed?: [x] No    [] Yes (see summary sheet for update)  Subjective functional status/changes:   [x] No changes reported    OBJECTIVE    Modalities Rationale:     decrease pain and increase tissue extensibility to improve patient's ability to perform ADLs without pain. min [] Estim, type/location:                                      []  att     []  unatt     []  w/US     []  w/ice    []  w/heat    min []  Mechanical Traction: type/lbs                   []  pro   []  sup   []  int   []  cont    []  before manual    []  after manual    min []  Ultrasound, settings/location:      min []  Iontophoresis w/ dexamethasone, location:                                               []  take home patch       []  in clinic   10 min []  Ice     [x]  Heat    location/position: L hip    min []  Vasopneumatic Device, press/temp:     min []  Other:    [x] Skin assessment post-treatment (if applicable):    [x]  intact    []  redness- no adverse reaction     []redness  adverse reaction:        40 min Therapeutic Exercise:  [x] See flow sheet :   Rationale: increase ROM, increase strength and improve coordination to improve the patients ability to perform ADLs without pain.           With   [] TE   [] TA   [] neuro   [] other: Patient Education: [x] Review HEP    [] Progressed/Changed HEP based on:   [] positioning   [] body mechanics   [] transfers   [] heat/ice application    [] other:      Pain Level (0-10 scale) post treatment: 5/10    ASSESSMENT/Changes in Function: Pt continues to progress with functional, full body therex incorporating compound movements with less cueing needed. Patient will continue to benefit from skilled PT services to modify and progress therapeutic interventions, address ROM deficits, address strength deficits and analyze and cue movement patterns to attain remaining goals. [x]  See Plan of Care  []  See progress note/recertification  []  See Discharge Summary         Progress towards goals / Updated goals:  Short Term Goals: To be accomplished in 4 weeks:  1. Patient will be compliant with HEP to progress toward goals and restore functional mobility. Eval Status: initiaited one exercise               AVOUOJY: 3/3/20 adherent to HEP daily   2. Patient will improve FOTO score by 10% to improve functional tolerance for ADLs. Eval Status: 63              Current: 3/3/20 63, Not Met  3. Patient will improve pain in L hip to 6/10  to improve ADL tolerance and restore prior level of function. Eval Status:10/10              MUGKDTI: 3/3/20 7/10 pain, Progressing   Long Term Goals: To be accomplished in 8 weeks:  1. Patient will be independent with HEP to progress toward goals of performing ADLs independently and efficienty. Eval Status: initiated              QMRVXKW: 3/3/20 adherent to HEP daily   2. Patricia Geiger will have improve spinal ROM in all directions without pain to improve mechanics for ambulation/ADLs. Eval Status: 25% with pain  2/18/20: 50% limited pain               Current: 3/3/20 50-75% no pain, Progressing  3. Patient will have 4+/5 BLE strength to return to goals of riding bike. Eval Status: 4/5 or less, hip flexion NT due to pain              Current: 3/3/20 4/5 no pain, Progressing  4. Patient will improve pain in L hip to 0-1/10 to improve ADL tolerance and restore prior level of function.   Eval Status: 10/10  Current: 2/20/20: 8/10  Current: 3/12/20 6/10 pain, Progressing     PLAN  [x]  Upgrade activities as tolerated     []  Continue plan of care  []  Update interventions per flow sheet       []  Discharge due to:_  []  Other:_      Kwan Cecy 3/12/2020  10:25 AM    Future Appointments   Date Time Provider Evelyn Crocker   3/12/2020 10:30 AM [facility]   3/17/2020  9:45 AM [facility]   3/20/2020  9:30 AM [facility]   3/24/2020  9:45 AM [facility]   3/27/2020  9:30 AM [facility]   3/31/2020  9:45 AM [facility]   4/2/2020 10:30 AM [facility]

## 2020-03-17 ENCOUNTER — APPOINTMENT (OUTPATIENT)
Dept: PHYSICAL THERAPY | Age: 70
End: 2020-03-17
Payer: COMMERCIAL

## 2020-03-19 ENCOUNTER — APPOINTMENT (OUTPATIENT)
Dept: PHYSICAL THERAPY | Age: 70
End: 2020-03-19
Payer: COMMERCIAL

## 2020-03-20 ENCOUNTER — APPOINTMENT (OUTPATIENT)
Dept: PHYSICAL THERAPY | Age: 70
End: 2020-03-20
Payer: COMMERCIAL

## 2020-03-24 ENCOUNTER — APPOINTMENT (OUTPATIENT)
Dept: PHYSICAL THERAPY | Age: 70
End: 2020-03-24
Payer: COMMERCIAL

## 2020-03-26 ENCOUNTER — APPOINTMENT (OUTPATIENT)
Dept: PHYSICAL THERAPY | Age: 70
End: 2020-03-26
Payer: COMMERCIAL

## 2020-03-27 ENCOUNTER — APPOINTMENT (OUTPATIENT)
Dept: PHYSICAL THERAPY | Age: 70
End: 2020-03-27
Payer: COMMERCIAL

## 2020-03-31 ENCOUNTER — APPOINTMENT (OUTPATIENT)
Dept: PHYSICAL THERAPY | Age: 70
End: 2020-03-31
Payer: COMMERCIAL

## 2020-04-02 ENCOUNTER — APPOINTMENT (OUTPATIENT)
Dept: PHYSICAL THERAPY | Age: 70
End: 2020-04-02

## 2020-04-07 NOTE — PROGRESS NOTES
[facility]  [address] [facility] [address], [address]  Ph [phone]  Fx [phone]    Physical Therapy Progress Note  Patient name: Noé Perea Start of Care: 2020   Referral source: Eugene Prabhakar MD : 1950   Medical/Treatment Diagnosis: Left hip pain [M25.552]  Low back pain [M54.5] Onset Date:2020   Prior Hospitalization: see medical history Provider#: [id number]   Medications: Verified on Patient Summary List    Comorbidities: Jose in L tibia/fibula , cervical fusion , diagnosed with spondylolisthesis    Prior Level of Function:Independent with all ADLs, and community activities        Visits from Start of Care: 12   Missed Visits: 2    Goals/Measure of Progress:    Short Term Goals: To be accomplished in 4 weeks:  1. Patient will be compliant with HEP to progress toward goals and restore functional mobility. Eval Status: initiaited one exercise                Current: 20 adherent to HEP daily   2. Patient will improve FOTO score by 10% to improve functional tolerance for ADLs. Eval Status: 63               Current: 2063, Not Met  3. Patient will improve pain in L hip to 6/10  to improve ADL tolerance and restore prior level of function. Eval Status:10/10               Current: 2/25/207/10 pain, Progressing   Long Term Goals: To be accomplished in 8 weeks:  1. Patient will be independent with HEP to progress toward goals of performing ADLs independently and efficienty. Eval Status: initiated               Current: 20adherent to HEP daily   2. Lanie Guardado will have improve spinal ROM in all directions without pain to improve mechanics for ambulation/ADLs. Eval Status: 25% with pain  20: 50% limited pain                Current: 2050-75% no pain, Progressing  3. Patient will have 4+/5 BLE strength to return to goals of riding bike.   Eval Status: 4/5 or less, hip flexion NT due to pain               Current:  no pain, Progressing  4. Patient will improve pain in L hip to 0-1/10 to improve ADL tolerance and restore prior level of function. Eval Status: 10/10  Current: 2/20/20: 8/10  Current: 2/25/207/10 pain, Progressing     Pt has been making good progress towards goals however is to be placed on hold at this time due public health concerns for [address]. Pt has been given an updated HEP with option of PT sending out an updated HEP via e-mail if needed to stay in communication with therapist. If pt's symptoms return and are unable to be managed with exercises at home pt educated to follow-up with therapist to be taken off hold as appropriate. Patient care will resume to previous established frequency when public health crisis has subsided.      ASSESSMENT/RECOMMENDATIONS:  [x]Continue therapy per initial plan/protocol once COVID 19 quarantine is lifted  []Continue therapy with the following recommended changes:_____________________ _____________________________ ________________________________________  []Discontinue therapy progressing towards or have reached established goals  []Discontinue therapy due to lack of appreciable progress towards goals  []Discontinue therapy due to lack of attendance or compliance  []Await Physician's recommendations/decisions regarding therapy  []Other:________________________________________________________________    Thank you for this referral.   Yaa Cochran 4/7/2020 9:11 AM

## 2022-09-28 ENCOUNTER — OFFICE VISIT (OUTPATIENT)
Dept: ORTHOPEDIC SURGERY | Age: 72
End: 2022-09-28
Payer: COMMERCIAL

## 2022-09-28 VITALS
RESPIRATION RATE: 18 BRPM | TEMPERATURE: 97.9 F | WEIGHT: 181 LBS | BODY MASS INDEX: 28.41 KG/M2 | HEIGHT: 67 IN | HEART RATE: 96 BPM | OXYGEN SATURATION: 97 %

## 2022-09-28 DIAGNOSIS — M43.16 SPONDYLOLISTHESIS AT L4-L5 LEVEL: Primary | ICD-10-CM

## 2022-09-28 DIAGNOSIS — M51.36 DDD (DEGENERATIVE DISC DISEASE), LUMBAR: ICD-10-CM

## 2022-09-28 PROCEDURE — 3017F COLORECTAL CA SCREEN DOC REV: CPT | Performed by: PHYSICAL MEDICINE & REHABILITATION

## 2022-09-28 PROCEDURE — G8417 CALC BMI ABV UP PARAM F/U: HCPCS | Performed by: PHYSICAL MEDICINE & REHABILITATION

## 2022-09-28 PROCEDURE — G8400 PT W/DXA NO RESULTS DOC: HCPCS | Performed by: PHYSICAL MEDICINE & REHABILITATION

## 2022-09-28 PROCEDURE — G8427 DOCREV CUR MEDS BY ELIG CLIN: HCPCS | Performed by: PHYSICAL MEDICINE & REHABILITATION

## 2022-09-28 PROCEDURE — G8536 NO DOC ELDER MAL SCRN: HCPCS | Performed by: PHYSICAL MEDICINE & REHABILITATION

## 2022-09-28 PROCEDURE — 99204 OFFICE O/P NEW MOD 45 MIN: CPT | Performed by: PHYSICAL MEDICINE & REHABILITATION

## 2022-09-28 PROCEDURE — G8432 DEP SCR NOT DOC, RNG: HCPCS | Performed by: PHYSICAL MEDICINE & REHABILITATION

## 2022-09-28 PROCEDURE — 1090F PRES/ABSN URINE INCON ASSESS: CPT | Performed by: PHYSICAL MEDICINE & REHABILITATION

## 2022-09-28 PROCEDURE — 1123F ACP DISCUSS/DSCN MKR DOCD: CPT | Performed by: PHYSICAL MEDICINE & REHABILITATION

## 2022-09-28 PROCEDURE — 1101F PT FALLS ASSESS-DOCD LE1/YR: CPT | Performed by: PHYSICAL MEDICINE & REHABILITATION

## 2022-09-28 RX ORDER — LANOLIN ALCOHOL/MO/W.PET/CERES
500 CREAM (GRAM) TOPICAL DAILY
COMMUNITY

## 2022-09-28 RX ORDER — METFORMIN HYDROCHLORIDE 500 MG/1
TABLET ORAL
COMMUNITY
Start: 2022-09-13

## 2022-09-28 RX ORDER — PREGABALIN 50 MG/1
50 CAPSULE ORAL 2 TIMES DAILY
Qty: 60 CAPSULE | Refills: 1 | Status: SHIPPED | OUTPATIENT
Start: 2022-09-28

## 2022-09-28 RX ORDER — OMEPRAZOLE 40 MG/1
40 CAPSULE, DELAYED RELEASE ORAL DAILY
COMMUNITY
Start: 2022-05-31

## 2022-09-28 RX ORDER — MELOXICAM 15 MG/1
15 TABLET ORAL DAILY
COMMUNITY

## 2022-09-28 RX ORDER — ATORVASTATIN CALCIUM 20 MG/1
20 TABLET, FILM COATED ORAL DAILY
COMMUNITY
Start: 2022-05-31

## 2022-09-28 RX ORDER — ASPIRIN 81 MG/1
81 TABLET ORAL DAILY
COMMUNITY

## 2022-09-28 NOTE — LETTER
9/28/2022    Patient: Joli Collet   YOB: 1950   Date of Visit: 9/28/2022     Chula Guy MD  [place] [phone]  Via Fax: [phone] [facility], [address] [place] [address]  [address] [place] [phone]  Via In [facility] [address]    Dear MD Boubacar Carreno MD,      Thank you for referring Ms. Joli Collet to [address] [place] [address] [facility] for evaluation. My notes for this consultation are attached. If you have questions, please do not hesitate to call me. I look forward to following your patient along with you.       Sincerely,    Suraj iTrado MD Normal rate, regular rhythm.  Heart sounds S1, S2.  No murmurs, rubs or gallops.

## 2022-09-28 NOTE — PROGRESS NOTES
[address]  [address], [address]  [place], [address] [place] [address] Street  Phone: [phone]  Fax: [phone]        Kaity Ibrahim  : 1950  PCP: Legih Scott MD    NEW PATIENT EVALUATION      ASSESSMENT AND PLAN    Diagnoses and all orders for this visit:    1. Spondylolisthesis at L4-L5 level  -     pregabalin (Lyrica) 50 mg capsule; Take 1 Capsule by mouth two (2) times a day. Max Daily Amount: 100 mg. Month 1: one po qhs x 1 week, then increase to one po bid thereafter    2. DDD (degenerative disc disease), lumbar  -     pregabalin (Lyrica) 50 mg capsule; Take 1 Capsule by mouth two (2) times a day. Max Daily Amount: 100 mg. 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 Her quality of life has declined. She has discogenic and facet mediated pain. I am uncertain that RF would provide her with the benefit that she seeks. We discussed spinal cord stimulator. We will going to start her on some low-dose pregabalin. Risks, benefits, alternatives, and limitations of RFA discussed with patient. Discussed SCS as a treatment option. Patient given additional information on this procedure. Trial of Lyrica 50 mg. Take 1 po QHS x one week then increase to 1 po BID thereafter  Advised to continue HEP as tolerated. Follow-up and Dispositions    Return in about 6 weeks (around 2022) for medication management. HISTORY OF PRESENT ILLNESS  Dora Carrasco is seen today at the request of Dr. Wilber Austin in consultation for low back pain/RFA consult. She reports worsening low back pain x 8 years. The pain increased after she stepped in a hole and fell while chasing a rabbit. Her pain is exacerbated with standing and walking. Pt cannot walk from her house to her trash can without pain.  Denies sciatic pain. Denies numbness or tingling BLE. Pt notes cramping in her left anterior calf with stretching. Pt mentions frequent falls. Her quality of life is diminishing, as she is unable to exercise and travel. She currently takes Mobic as needed. Denies side effects. Pt had no benefit with her TENS unit in past. Pt has been receiving cortisone injections in her knees without side effects. She is compliant with her HEP. Denies sciatica. Denies persistent fevers, chills, weight changes, saddle paresthesias, and neurogenic bowel or bladder symptoms. Affirms mixed urinary incontinence. Note from Dr. Beckie Antonio reviewed. She is unable to lay prone due to fibrocystic breasts. She has difficulty with flushing with prednisone, however she is able to tolerate knee corticosteroid injections. She is intolerant of IV contrast which makes her itch. She does not believe she has had any gabapentin, Lyrica, or Cymbalta. She is on Zoloft, so not a candidate for duloxetine. Pain Assessment  9/28/2022   Location of Pain Back   Severity of Pain 10   Quality of Pain Throbbing; Sharp   Duration of Pain Persistent   Frequency of Pain Intermittent   Aggravating Factors Standing;Walking   Limiting Behavior Yes   Relieving Factors Other (Comment)   Relieving Factors Comment lying flat   Result of Injury No       Onset of pain: 15+ years, worse since 2014      Investigations:   C MRI 6/2022: intact fusion C5-7, disc protrusion C4-5  L MRI 2021: grade one listhesis L4-5, lateral recess stenosis L5-S1, multilevel disc bulges  Spine surgery consult: 2018 [facility], no surgery recommended, bariatric sx discussed    Treatments:  Physical therapy: 2019  Spinal injections: no  Spinal surgery- ACDF C5/6/7 2002  Beneficial medications: Mobic  Failed medications: TENS    Work Status: retired PhD psychologist  Pertinent PMHx:  asthma, depression, DM, anxiety, HTN, vertigo, osteoporosis, mixed urinary incontinence, Hashimoto's thyroiditis.      Visit Vitals  Pulse 96   Temp 97.9 °F (36.6 °C) (Temporal)   Resp 18   Ht 5' 7\" (1.702 m)   Wt 181 lb (82.1 kg)   SpO2 97%   BMI 28.35 kg/m²       PHYSICAL EXAM    Pain with lumbar flexion fingertips to knees, less pain with extension and left lateral bending  TTP left trochanteric bursa  Difficulty standing at neutral  LE strength intact  SLR negative  No edema        Past Medical History:   Diagnosis Date    Hx of cervical cancer     Hx of colon cancer, stage I     HX: breast cancer         Past Surgical History:   Procedure Laterality Date    HX BREAST BIOPSY      HX CERVICAL FUSION      2002 ACDF C5/6/7    HX ORTHOPAEDIC           Current Outpatient Medications   Medication Sig Dispense Refill    ascorbic acid (VITAMIN C) 500 mg chewable tablet Take 500 mg by mouth daily. aspirin delayed-release 81 mg tablet Take 81 mg by mouth daily. atorvastatin (LIPITOR) 20 mg tablet Take 20 mg by mouth daily. meloxicam (MOBIC) 15 mg tablet Take 15 mg by mouth daily. metFORMIN (GLUCOPHAGE) 500 mg tablet       omeprazole (PRILOSEC) 40 mg capsule Take 40 mg by mouth daily. pregabalin (Lyrica) 50 mg capsule Take 1 Capsule by mouth two (2) times a day. Max Daily Amount: 100 mg. Month 1: one po qhs x 1 week, then increase to one po bid thereafter 60 Capsule 1    clonazePAM (KlonoPIN) 0.5 mg tablet Take  by mouth nightly as needed. MONTELUKAST SODIUM (SINGULAIR PO) Take  by mouth. sertraline (ZOLOFT) 50 mg tablet Take  by mouth daily. amLODIPine (NORVASC) 5 mg tablet Take 5 mg by mouth daily. Cetirizine 10 mg cap Take  by mouth. CETIRIZINE HCL (ZYRTEC PO) Take  by mouth. nabumetone (RELAFEN) 500 mg tablet Take  by mouth two (2) times a day. (Patient not taking: Reported on 9/28/2022)      pantoprazole (PROTONIX) 40 mg tablet Take 40 mg by mouth daily.    (Patient not taking: Reported on 9/28/2022)

## 2022-09-28 NOTE — PROGRESS NOTES
David Xiao presents today for   Chief Complaint   Patient presents with    Back Pain       Is someone accompanying this pt? no    Is the patient using any DME equipment during OV? no    Depression Screening:  No flowsheet data found. Learning Assessment:  No flowsheet data found. Abuse Screening:  No flowsheet data found. Fall Risk  Fall Risk Assessment, last 12 mths 9/28/2022   Able to walk? Yes   Fall in past 12 months? 1   Do you feel unsteady? 1   Are you worried about falling 1   Number of falls in past 12 months 2   Fall with injury? 1       OPIOID RISK TOOL  No flowsheet data found. Coordination of Care:  1. Have you been to the ER, urgent care clinic since your last visit? no  Hospitalized since your last visit? no    2. Have you seen or consulted any other health care providers outside of the [address] [place] [address] [facility] since your last visit? no Include any pap smears or colon screening.  no